# Patient Record
Sex: MALE | HISPANIC OR LATINO
[De-identification: names, ages, dates, MRNs, and addresses within clinical notes are randomized per-mention and may not be internally consistent; named-entity substitution may affect disease eponyms.]

---

## 2017-02-03 ENCOUNTER — APPOINTMENT (OUTPATIENT)
Dept: COLORECTAL SURGERY | Facility: CLINIC | Age: 62
End: 2017-02-03

## 2017-02-03 VITALS
DIASTOLIC BLOOD PRESSURE: 83 MMHG | SYSTOLIC BLOOD PRESSURE: 144 MMHG | HEART RATE: 68 BPM | HEIGHT: 66 IN | BODY MASS INDEX: 28.12 KG/M2 | WEIGHT: 175 LBS

## 2017-02-03 DIAGNOSIS — K60.3 ANAL FISTULA: ICD-10-CM

## 2018-10-11 ENCOUNTER — APPOINTMENT (OUTPATIENT)
Dept: CARDIOLOGY | Facility: CLINIC | Age: 63
End: 2018-10-11
Payer: COMMERCIAL

## 2018-10-11 VITALS
WEIGHT: 178 LBS | HEART RATE: 62 BPM | DIASTOLIC BLOOD PRESSURE: 73 MMHG | SYSTOLIC BLOOD PRESSURE: 138 MMHG | HEIGHT: 66 IN | BODY MASS INDEX: 28.61 KG/M2 | RESPIRATION RATE: 16 BRPM

## 2018-10-11 DIAGNOSIS — Z00.00 ENCOUNTER FOR GENERAL ADULT MEDICAL EXAMINATION W/OUT ABNORMAL FINDINGS: ICD-10-CM

## 2018-10-11 PROCEDURE — 99244 OFF/OP CNSLTJ NEW/EST MOD 40: CPT

## 2018-10-11 PROCEDURE — 93000 ELECTROCARDIOGRAM COMPLETE: CPT

## 2019-02-28 ENCOUNTER — APPOINTMENT (OUTPATIENT)
Dept: CARDIOLOGY | Facility: CLINIC | Age: 64
End: 2019-02-28
Payer: COMMERCIAL

## 2019-02-28 ENCOUNTER — RECORD ABSTRACTING (OUTPATIENT)
Age: 64
End: 2019-02-28

## 2019-02-28 ENCOUNTER — NON-APPOINTMENT (OUTPATIENT)
Age: 64
End: 2019-02-28

## 2019-02-28 VITALS
HEART RATE: 73 BPM | SYSTOLIC BLOOD PRESSURE: 124 MMHG | RESPIRATION RATE: 16 BRPM | BODY MASS INDEX: 29.57 KG/M2 | DIASTOLIC BLOOD PRESSURE: 68 MMHG | WEIGHT: 184 LBS | HEIGHT: 66 IN

## 2019-02-28 PROCEDURE — 93000 ELECTROCARDIOGRAM COMPLETE: CPT

## 2019-02-28 PROCEDURE — 99214 OFFICE O/P EST MOD 30 MIN: CPT

## 2019-02-28 NOTE — REASON FOR VISIT
[Follow-Up - Clinic] : a clinic follow-up of [FreeTextEntry1] : The patient is a 64-year-old  male who has a history of abnormal EKG and occasional exertional dyspnea and presents back to the office today for general cardiac checkup. His last echocardiogram showed mild to moderate valvular insufficiency as well borderline enlargement of the left atrium. He states he's been physically active on the job and usually does okay but does no formal exercise and has gained some weight over the past several months.;\par \par There is been no significant chest pain, palpitations or dizziness;

## 2019-02-28 NOTE — REVIEW OF SYSTEMS
[Recent Weight Gain (___ Lbs)] : recent [unfilled] ~Ulb weight gain [Shortness Of Breath] : shortness of breath [Dyspnea on exertion] : dyspnea during exertion [Negative] : Heme/Lymph

## 2019-02-28 NOTE — PHYSICAL EXAM
[Normal Conjunctiva] : the conjunctiva exhibited no abnormalities [Eyelids - No Xanthelasma] : the eyelids demonstrated no xanthelasmas [Normal Oral Mucosa] : normal oral mucosa [No Oral Pallor] : no oral pallor [No Oral Cyanosis] : no oral cyanosis [Normal Jugular Venous A Waves Present] : normal jugular venous A waves present [Normal Jugular Venous V Waves Present] : normal jugular venous V waves present [No Jugular Venous Carlos A Waves] : no jugular venous carlos A waves [Respiration, Rhythm And Depth] : normal respiratory rhythm and effort [Exaggerated Use Of Accessory Muscles For Inspiration] : no accessory muscle use [Auscultation Breath Sounds / Voice Sounds] : lungs were clear to auscultation bilaterally [Abdomen Soft] : soft [Abdomen Tenderness] : non-tender [Abdomen Mass (___ Cm)] : no abdominal mass palpated [Abnormal Walk] : normal gait [Gait - Sufficient For Exercise Testing] : the gait was sufficient for exercise testing [Nail Clubbing] : no clubbing of the fingernails [Cyanosis, Localized] : no localized cyanosis [Petechial Hemorrhages (___cm)] : no petechial hemorrhages [Skin Color & Pigmentation] : normal skin color and pigmentation [] : no rash [No Venous Stasis] : no venous stasis [Skin Lesions] : no skin lesions [No Xanthoma] : no  xanthoma was observed [FreeTextEntry1] : Pruritus without definite rash over the left pectoral area [Oriented To Time, Place, And Person] : oriented to person, place, and time [Affect] : the affect was normal [Mood] : the mood was normal [No Anxiety] : not feeling anxious

## 2019-02-28 NOTE — ASSESSMENT
[FreeTextEntry1] : EKG demonstrates normal sinus rhythm with nonspecific ST-T changes;;\par \par In summary the patient is a 64-year-old gentleman with history of borderline abnormal EKG, some intermittent exertional dyspnea and history of valvular insufficiency who is concerned about developing any additional cardiovascular disease and returns today for a general checkup\par \par Plan:\par \par I recommended patient schedule a transthoracic echo to assess his cardiac function and valvulopathy in the near future\par \par Recommend graded cardiovascular exercise stress test to rule out any underlying CAD or anginal equivalent;\par \par Patient counseled on importance of low-carb weight reducing diet and recommend followup in his office within 4 months or p.r.n.\par ;;;

## 2019-04-05 ENCOUNTER — APPOINTMENT (OUTPATIENT)
Dept: COLORECTAL SURGERY | Facility: CLINIC | Age: 64
End: 2019-04-05

## 2019-04-15 ENCOUNTER — APPOINTMENT (OUTPATIENT)
Dept: CARDIOLOGY | Facility: CLINIC | Age: 64
End: 2019-04-15
Payer: COMMERCIAL

## 2019-04-15 PROCEDURE — 93306 TTE W/DOPPLER COMPLETE: CPT

## 2019-04-15 PROCEDURE — 93015 CV STRESS TEST SUPVJ I&R: CPT

## 2019-07-11 ENCOUNTER — APPOINTMENT (OUTPATIENT)
Dept: CARDIOLOGY | Facility: CLINIC | Age: 64
End: 2019-07-11

## 2019-08-14 ENCOUNTER — NON-APPOINTMENT (OUTPATIENT)
Age: 64
End: 2019-08-14

## 2019-08-14 ENCOUNTER — APPOINTMENT (OUTPATIENT)
Dept: CARDIOLOGY | Facility: CLINIC | Age: 64
End: 2019-08-14
Payer: COMMERCIAL

## 2019-08-14 VITALS
HEART RATE: 69 BPM | RESPIRATION RATE: 16 BRPM | BODY MASS INDEX: 28.93 KG/M2 | DIASTOLIC BLOOD PRESSURE: 68 MMHG | HEIGHT: 66 IN | WEIGHT: 180 LBS | SYSTOLIC BLOOD PRESSURE: 140 MMHG

## 2019-08-14 DIAGNOSIS — N52.9 MALE ERECTILE DYSFUNCTION, UNSPECIFIED: ICD-10-CM

## 2019-08-14 PROCEDURE — 99214 OFFICE O/P EST MOD 30 MIN: CPT

## 2019-08-14 PROCEDURE — 93000 ELECTROCARDIOGRAM COMPLETE: CPT

## 2020-02-03 ENCOUNTER — NON-APPOINTMENT (OUTPATIENT)
Age: 65
End: 2020-02-03

## 2020-02-03 ENCOUNTER — APPOINTMENT (OUTPATIENT)
Dept: CARDIOLOGY | Facility: CLINIC | Age: 65
End: 2020-02-03
Payer: COMMERCIAL

## 2020-02-03 VITALS
HEART RATE: 70 BPM | SYSTOLIC BLOOD PRESSURE: 142 MMHG | HEIGHT: 66 IN | RESPIRATION RATE: 16 BRPM | BODY MASS INDEX: 29.89 KG/M2 | DIASTOLIC BLOOD PRESSURE: 78 MMHG | WEIGHT: 186 LBS

## 2020-02-03 DIAGNOSIS — I51.7 CARDIOMEGALY: ICD-10-CM

## 2020-02-03 PROCEDURE — 93000 ELECTROCARDIOGRAM COMPLETE: CPT

## 2020-02-03 PROCEDURE — 99214 OFFICE O/P EST MOD 30 MIN: CPT

## 2020-02-03 RX ORDER — ASPIRIN 81 MG
81 TABLET, DELAYED RELEASE (ENTERIC COATED) ORAL
Refills: 0 | Status: DISCONTINUED | COMMUNITY
End: 2020-02-03

## 2020-02-03 NOTE — HISTORY OF PRESENT ILLNESS
[FreeTextEntry1] : He's had a few other somatic complaints such as possible food poisoning with diarrhea last month that lasted almost 3 days\par \par Additionally he states he has had some erectile dysfunction and is asking if it would be safe to our fracture sildenafil;;

## 2020-02-03 NOTE — PHYSICAL EXAM
[Normal Conjunctiva] : the conjunctiva exhibited no abnormalities [Eyelids - No Xanthelasma] : the eyelids demonstrated no xanthelasmas [Normal Oral Mucosa] : normal oral mucosa [No Oral Cyanosis] : no oral cyanosis [No Oral Pallor] : no oral pallor [Normal Jugular Venous A Waves Present] : normal jugular venous A waves present [No Jugular Venous Carlos A Waves] : no jugular venous carlos A waves [Normal Jugular Venous V Waves Present] : normal jugular venous V waves present [Respiration, Rhythm And Depth] : normal respiratory rhythm and effort [Auscultation Breath Sounds / Voice Sounds] : lungs were clear to auscultation bilaterally [Exaggerated Use Of Accessory Muscles For Inspiration] : no accessory muscle use [Abdomen Soft] : soft [Abdomen Tenderness] : non-tender [Abdomen Mass (___ Cm)] : no abdominal mass palpated [Abnormal Walk] : normal gait [Gait - Sufficient For Exercise Testing] : the gait was sufficient for exercise testing [Nail Clubbing] : no clubbing of the fingernails [Petechial Hemorrhages (___cm)] : no petechial hemorrhages [Cyanosis, Localized] : no localized cyanosis [] : no rash [Skin Color & Pigmentation] : normal skin color and pigmentation [Skin Lesions] : no skin lesions [No Venous Stasis] : no venous stasis [FreeTextEntry1] : Pruritus without definite rash over the left pectoral area [No Xanthoma] : no  xanthoma was observed [Affect] : the affect was normal [Oriented To Time, Place, And Person] : oriented to person, place, and time [Mood] : the mood was normal [No Anxiety] : not feeling anxious

## 2020-02-03 NOTE — ASSESSMENT
[FreeTextEntry1] : EKG demonstrates normal sinus rhythm with some nonspecific ST-T changes-essentially unchanged\par \par Greater cardiovascular exercise stress test from April 2019-good exercise tolerance with no arrhythmias seen, normal blood pressures once, and EKG remained negative for ST abnormalities or ischemia\par \par Transthoracic echocardiogram from 4/15/19 demonstrated normal chamber sizes and wall motion with normal LV systolic function and normal ejection fraction 60%\par Trace to mild mitral and tricuspid insufficiency;;;;\par \par \par In summary the patient is a 65-year-old gentleman with a history of borderline abnormal EKG and some nondescript chest complaints in the past he was at a stable pattern of recent but complaining of ED\par \par Plan:\par \par No cardiac contraindication to trying Viagra;\par \par (Will Rx 50 mg p.r.n.)\par \par No additional cardiac workup indicated at this time\par \par Patient encouraged to pursue sensible low-carb weight reducing diet and moderate physical activity\par \par Followup within 6 months or p.r.n.;;;;;

## 2020-02-03 NOTE — REASON FOR VISIT
[Follow-Up - Clinic] : a clinic follow-up of [FreeTextEntry1] : The patient is a 65-year-old  gentleman who presents back to the office today for general cardiac checkup. He has a history of oral and abnormal EKG and occasional exertional dyspnea who underwent a cardiac evaluation this past year including an echocardiogram and graded cardiovascular exercise stress test;\par \par Fortunately, he reports he's been feeling well and has had no significant symptoms for chest pain, shortness of breath, palpitations, dizziness or syncope\par \par ;

## 2020-07-29 ENCOUNTER — APPOINTMENT (OUTPATIENT)
Dept: CARDIOLOGY | Facility: CLINIC | Age: 65
End: 2020-07-29
Payer: COMMERCIAL

## 2020-07-29 ENCOUNTER — NON-APPOINTMENT (OUTPATIENT)
Age: 65
End: 2020-07-29

## 2020-07-29 VITALS
SYSTOLIC BLOOD PRESSURE: 126 MMHG | HEIGHT: 66 IN | BODY MASS INDEX: 29.73 KG/M2 | DIASTOLIC BLOOD PRESSURE: 80 MMHG | RESPIRATION RATE: 16 BRPM | WEIGHT: 185 LBS | TEMPERATURE: 98.4 F | HEART RATE: 64 BPM

## 2020-07-29 DIAGNOSIS — R94.31 ABNORMAL ELECTROCARDIOGRAM [ECG] [EKG]: ICD-10-CM

## 2020-07-29 DIAGNOSIS — I07.1 RHEUMATIC TRICUSPID INSUFFICIENCY: ICD-10-CM

## 2020-07-29 DIAGNOSIS — R07.9 CHEST PAIN, UNSPECIFIED: ICD-10-CM

## 2020-07-29 PROCEDURE — 99214 OFFICE O/P EST MOD 30 MIN: CPT

## 2020-07-29 PROCEDURE — 93000 ELECTROCARDIOGRAM COMPLETE: CPT

## 2020-07-29 NOTE — ASSESSMENT
[FreeTextEntry1] : EKG demonstrates normal sinus rhythm with borderline nonspecific T wave changes-nonacute;\par \par Last cardiac stress test was performed in April 2018 and was negative for symptoms or ST abnormalities or ischemia;\par \par In summary this 65-year-old gentleman has no significant cardiac symptoms at this time and has been stable from a cardiac stand;\par \par Plan:\par \par No additional cardiac workup indicated at this time;\par \par Okay to followup within 6 months or\par \par Will renew Rx for sildenafil;;

## 2020-07-29 NOTE — HISTORY OF PRESENT ILLNESS
[FreeTextEntry1] : Patient is currently on no prescription medication but does take Viagra 50 mg p.r.n.;\par \par

## 2020-07-29 NOTE — REASON FOR VISIT
[Follow-Up - Clinic] : a clinic follow-up of [FreeTextEntry1] : The patient is a 65-year-old  gentleman who has a known history for occasional exertional dyspnea and borderline abnormal EKG who presents back to the office for general cardiac checkup today;\par \par Overall, patient reports he's been feeling generally well and has been working through most of the corona virus pandemic except for the first few weeks, and has been back at the machine shop;\par \par There's been no significant chest pain, shortness of breath, palpitations or dizziness;

## 2021-01-26 ENCOUNTER — APPOINTMENT (OUTPATIENT)
Dept: OTOLARYNGOLOGY | Facility: CLINIC | Age: 66
End: 2021-01-26
Payer: COMMERCIAL

## 2021-01-26 VITALS
SYSTOLIC BLOOD PRESSURE: 149 MMHG | TEMPERATURE: 97.3 F | BODY MASS INDEX: 29.57 KG/M2 | DIASTOLIC BLOOD PRESSURE: 80 MMHG | WEIGHT: 184 LBS | HEART RATE: 76 BPM | HEIGHT: 66 IN

## 2021-01-26 DIAGNOSIS — J31.0 CHRONIC RHINITIS: ICD-10-CM

## 2021-01-26 DIAGNOSIS — J34.2 DEVIATED NASAL SEPTUM: ICD-10-CM

## 2021-01-26 DIAGNOSIS — H93.12 TINNITUS, LEFT EAR: ICD-10-CM

## 2021-01-26 DIAGNOSIS — H90.5 UNSPECIFIED SENSORINEURAL HEARING LOSS: ICD-10-CM

## 2021-01-26 DIAGNOSIS — J34.3 HYPERTROPHY OF NASAL TURBINATES: ICD-10-CM

## 2021-01-26 DIAGNOSIS — H83.3X3 NOISE EFFECTS ON INNER EAR, BILATERAL: ICD-10-CM

## 2021-01-26 PROCEDURE — 99072 ADDL SUPL MATRL&STAF TM PHE: CPT

## 2021-01-26 PROCEDURE — 99244 OFF/OP CNSLTJ NEW/EST MOD 40: CPT

## 2021-01-26 PROCEDURE — 92550 TYMPANOMETRY & REFLEX THRESH: CPT

## 2021-01-26 PROCEDURE — 92563 TONE DECAY HEARING TEST: CPT

## 2021-01-26 PROCEDURE — 92557 COMPREHENSIVE HEARING TEST: CPT

## 2021-01-26 PROCEDURE — 92588 EVOKED AUDITORY TST COMPLETE: CPT

## 2021-01-26 RX ORDER — SILDENAFIL 50 MG/1
50 TABLET ORAL
Qty: 5 | Refills: 3 | Status: DISCONTINUED | COMMUNITY
Start: 2019-08-14 | End: 2021-01-26

## 2021-01-26 NOTE — ADDENDUM
[FreeTextEntry1] : Documented by Monie Luu acting as scribe for Dr. Moore on 01/26/2021.\par \par All Medical record entries made by the Scribe were at my, Dr. Moore, direction and personally dictated by me on 01/26/2021 . I have reviewed the chart and agree that the record accurately reflects my personal performance of the history, physical exam, assessment and plan. I have also personally directed, reviewed, and agreed with the discharge instructions.

## 2021-01-26 NOTE — PHYSICAL EXAM
[] : septum deviated to the right [Midline] : trachea is located in midline position [Clear / Open well] : hypopharynx is clear and opens well [Normal] : no rashes [Hearing Loss Right Only] : normal [Hearing Loss Left Only] : normal [FreeTextEntry5] : No response to núñez.  [de-identified] : large and swollen [FreeTextEntry1] : type 3 oral cavity, tonsils class 2, uvula flips up when he opens his mouth

## 2021-01-26 NOTE — HISTORY OF PRESENT ILLNESS
[de-identified] : The patient presents with left constant tinnitus which he describes as a solid sound for 1 year. Denies any hearing loss. 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 He has a lot of noise surrounding him at work but denies more noise on one side than the other. Denies any head trauma or noise exposure outside of work. He was in the  when he was younger but didn't shoot a gun often. The tinnitus is worse at night when it's quiet.

## 2021-01-26 NOTE — CONSULT LETTER
[Dear  ___] : Dear  [unfilled], [Courtesy Letter:] : I had the pleasure of seeing your patient, [unfilled], in my office today. [Please see my note below.] : Please see my note below. [Consult Closing:] : Thank you very much for allowing me to participate in the care of this patient.  If you have any questions, please do not hesitate to contact me. [Sincerely,] : Sincerely, [FreeTextEntry3] : Dat Moore MD FACS

## 2021-01-26 NOTE — ASSESSMENT
[FreeTextEntry1] : Reviewed and reconciled medications, allergies, PMHx, PSHx, SocHx, FMHx.\par \par left tinnitus\par work related loud noise exposure\par \par Audio: bilateral hearing WNL sloping to a severe SNHL, right slightly worse than left, right 80% discrimination at 60 db, left 88% discrimination at 55 db, right type Ad tymp, left type A tymp, bilateral ETD, tone decay neg\par \par Plan:\par Audio - results interpreted by Dr. Moore and reviewed with the patient. Discussed r/b/a of arches tinnitus formula and lipoflavinoids pending ABR. Discussed r/b/a of white noise, pillow radio. ABR. MRI if ABR is abnormal. Consider amplification pending testing. FU after test.

## 2021-03-05 ENCOUNTER — APPOINTMENT (OUTPATIENT)
Dept: OTOLARYNGOLOGY | Facility: CLINIC | Age: 66
End: 2021-03-05
Payer: COMMERCIAL

## 2021-03-05 PROCEDURE — 99072 ADDL SUPL MATRL&STAF TM PHE: CPT

## 2021-03-05 PROCEDURE — 92653 AEP NEURODIAGNOSTIC I&R: CPT

## 2021-03-26 ENCOUNTER — APPOINTMENT (OUTPATIENT)
Dept: OTOLARYNGOLOGY | Facility: CLINIC | Age: 66
End: 2021-03-26

## 2023-04-27 ENCOUNTER — OFFICE (OUTPATIENT)
Dept: URBAN - METROPOLITAN AREA CLINIC 104 | Facility: CLINIC | Age: 68
Setting detail: OPHTHALMOLOGY
End: 2023-04-27
Payer: COMMERCIAL

## 2023-04-27 DIAGNOSIS — H01.005: ICD-10-CM

## 2023-04-27 DIAGNOSIS — H01.002: ICD-10-CM

## 2023-04-27 DIAGNOSIS — H01.001: ICD-10-CM

## 2023-04-27 DIAGNOSIS — H01.004: ICD-10-CM

## 2023-04-27 DIAGNOSIS — H25.13: ICD-10-CM

## 2023-04-27 PROCEDURE — 92014 COMPRE OPH EXAM EST PT 1/>: CPT | Performed by: OPHTHALMOLOGY

## 2023-04-27 ASSESSMENT — CONFRONTATIONAL VISUAL FIELD TEST (CVF)
OD_FINDINGS: FULL
OS_FINDINGS: FULL

## 2023-04-27 ASSESSMENT — VISUAL ACUITY
OS_BCVA: 20/25
OD_BCVA: 20/50

## 2023-04-27 ASSESSMENT — REFRACTION_AUTOREFRACTION
OS_SPHERE: +0.25
OS_CYLINDER: -0.75
OS_AXIS: 139
OD_AXIS: 094
OD_CYLINDER: -0.75
OD_SPHERE: +0.50

## 2023-04-27 ASSESSMENT — SPHEQUIV_DERIVED
OD_SPHEQUIV: 0.125
OS_SPHEQUIV: -0.125

## 2023-04-27 ASSESSMENT — KERATOMETRY
OD_K1POWER_DIOPTERS: 42.03
OS_K2POWER_DIOPTERS: 43.21
OS_AXISANGLE_DEGREES: 080
OS_K1POWER_DIOPTERS: 42.78
OD_K2POWER_DIOPTERS: 42.35
OD_AXISANGLE_DEGREES: 174

## 2023-04-27 ASSESSMENT — LID EXAM ASSESSMENTS
OD_BLEPHARITIS: RLL RUL 3+
OS_BLEPHARITIS: LLL LUL 3+

## 2023-04-27 ASSESSMENT — TONOMETRY
OD_IOP_MMHG: 12
OS_IOP_MMHG: 12

## 2023-04-27 ASSESSMENT — AXIALLENGTH_DERIVED
OD_AL: 24.0325
OS_AL: 23.8284

## 2023-07-25 ENCOUNTER — APPOINTMENT (OUTPATIENT)
Dept: COLORECTAL SURGERY | Facility: CLINIC | Age: 68
End: 2023-07-25
Payer: COMMERCIAL

## 2023-07-25 VITALS
WEIGHT: 182 LBS | DIASTOLIC BLOOD PRESSURE: 77 MMHG | BODY MASS INDEX: 29.25 KG/M2 | RESPIRATION RATE: 16 BRPM | HEART RATE: 69 BPM | HEIGHT: 66 IN | SYSTOLIC BLOOD PRESSURE: 149 MMHG

## 2023-07-25 DIAGNOSIS — Z86.010 PERSONAL HISTORY OF COLONIC POLYPS: ICD-10-CM

## 2023-07-25 DIAGNOSIS — K63.5 POLYP OF COLON: ICD-10-CM

## 2023-07-25 PROCEDURE — 99204 OFFICE O/P NEW MOD 45 MIN: CPT

## 2023-07-28 PROBLEM — Z86.010 HISTORY OF COLON POLYPS: Status: ACTIVE | Noted: 2023-07-28

## 2023-07-28 PROBLEM — K63.5 COLON POLYP: Status: ACTIVE | Noted: 2023-07-28

## 2023-07-28 NOTE — PHYSICAL EXAM
[Abdomen Masses] : No abdominal masses [Tender] : nontender [JVD] : no jugular venous distention  [Normal Breath Sounds] : Normal breath sounds [Normal Heart Sounds] : normal heart sounds [Purpura] : purpura [Alert] : alert [Oriented to Person] : oriented to person [Oriented to Place] : oriented to place [Oriented to Time] : oriented to time [Calm] : calm [de-identified] : looks well nad

## 2023-07-28 NOTE — ASSESSMENT
[FreeTextEntry1] : 68 year old male with previous colon polyps. recommend colonoscopy with PEG prep. risk and benefits explained including bleeding, perforations, missing a polyp or cancer in 5%

## 2023-07-28 NOTE — HISTORY OF PRESENT ILLNESS
[FreeTextEntry1] : 68 hoang old male with previous colon polyps. has had previous perirectal abscess with fistula and had surgery for that with good outcomes.